# Patient Record
Sex: FEMALE | Race: WHITE
[De-identification: names, ages, dates, MRNs, and addresses within clinical notes are randomized per-mention and may not be internally consistent; named-entity substitution may affect disease eponyms.]

---

## 2021-07-26 ENCOUNTER — HOSPITAL ENCOUNTER (EMERGENCY)
Dept: HOSPITAL 33 - ED | Age: 51
Discharge: HOME | End: 2021-07-26
Payer: COMMERCIAL

## 2021-07-26 VITALS — SYSTOLIC BLOOD PRESSURE: 138 MMHG | HEART RATE: 90 BPM | DIASTOLIC BLOOD PRESSURE: 90 MMHG

## 2021-07-26 VITALS — OXYGEN SATURATION: 98 %

## 2021-07-26 DIAGNOSIS — S61.306A: Primary | ICD-10-CM
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
## 2021-07-26 PROCEDURE — 90471 IMMUNIZATION ADMIN: CPT

## 2021-07-26 PROCEDURE — 99283 EMERGENCY DEPT VISIT LOW MDM: CPT

## 2021-07-26 PROCEDURE — 90715 TDAP VACCINE 7 YRS/> IM: CPT

## 2021-07-26 NOTE — ERPHSYRPT
- History of Present Illness


Source: patient


Exam Limitations: no limitations


Patient Subjective Stated Complaint: Pt states "I was using a mandaline slicer 

and cut the side of my pinky on the right hand.  It has been bleeding for the 

past hour and I didnt know where else to go."


Triage Nursing Assessment: Pt presetend alert and oriented X 3, skin pwd Pt 

ambulates with an upright steady gait, able to speak in clear full sentences pt 

in no apparent respiratory distress. Pt has peeled the skin off the lateral side

of her right little finger.


Physician History: 





Mandolin tissue avulsion R ulnar side 5th digit before arrival at home. Pt is R 

handed and denies other/previous injury. Tetanus to be given in ER.


Occurred: just prior to arrival


Method of Injury: incised


Quality: constant


Severity of Pain-Max: mild


Severity of Pain-Current: mild


Extremities Pain Location: 5th finger: right


Modifying Factors: Improves With: movement


Associated Symptoms: none


Allergies/Adverse Reactions: 








No Known Drug Allergies Allergy (Verified 07/26/21 17:18)


   





Home Medications: 








No Reportable Medications [No Reported Medications]  07/26/21 [History]





Hx Tetanus, Diphtheria Vaccination/Date Given: No


Hx Influenza Vaccination/Date Given: No


Hx Pneumococcal Vaccination/Date Given: No


Immunizations Up to Date: Yes





Travel Risk





- International Travel


Have you traveled outside of the country in past 3 weeks: No





- Coronavirus Screening


Are you exhibiting any of the following symptoms?: No


Close contact with a COVID-19 positive Pt in past 14-21 Days: No





- Vaccine Status


Have you recieved a Covid-19 vaccination: No





- Review of Systems


Constitutional: No Symptoms


Eyes: No Symptoms


Ears, Nose, & Throat: No Symptoms


Respiratory: No Symptoms


Cardiac: No Symptoms


Abdominal/Gastrointestinal: No Symptoms


Genitourinary Symptoms: No Symptoms


Skin: No Symptoms


Neurological: No Symptoms


Psychological: No Symptoms


Endocrine: No Symptoms


Hematologic/Lymphatic: No Symptoms


Immunological/Allergic: No Symptoms





- Past Medical History


Pertinent Past Medical History: No


Neurological History: No Pertinent History


Cardiac History: No Pertinent History


Respiratory History: No Pertinent History


Endocrine Medical History: No Pertinent History


Musculoskeletal History: No Pertinent History





- Past Surgical History


Past Surgical History: No





- Social History


Smoking Status: Never smoker


Exposure to second hand smoke: No


Drug Use: none


Patient Lives Alone: No





- Female History


Hx Last Menstrual Period: 06/20/2021


Hx Pregnant Now: No





- Nursing Vital Signs


Nursing Vital Signs: 


                               Initial Vital Signs











Temperature  98.2 F   07/26/21 17:11


 


Pulse Rate  102 H  07/26/21 17:11


 


Respiratory Rate  20   07/26/21 17:11


 


Blood Pressure  144/96   07/26/21 17:11


 


O2 Sat by Pulse Oximetry  98   07/26/21 17:11








                                   Pain Scale











Pain Intensity                 0











Tachy/Hypertensive





- Physical Exam


General Appearance: no apparent distress


Eyes, Ears, Nose, Throat Exam: normal ENT inspection, TMs normal


Neck Exam: normal inspection, non-tender


Cardiovascular/Respiratory Exam: normal breath sounds, tachycardia (Very mild)


Abdominal Exam: non-tender


Back Exam: normal inspection


Shoulder Exam: normal inspection


Elbow/Forearm Exam: normal inspection


Wrist Exam: normal inspection


Hand Exam: laceration (Avulsion distal, ulnar 5th digit/good distal capillary 

return and sensation/nail-nailbed not invilved)


Neuro/Tendon Exam: normal sensation, normal motor functions, normal tendon 

functions, responds to pain


Mental Status Exam: alert, oriented x 3, cooperative


Skin Exam: normal color, warm, dry, No rash


**SpO2 Interpretation**: normal


SpO2: 98


O2 Delivery: Room Air





- Course


Nursing assessment & vital signs reviewed: Yes


Ordered Tests: 


Medication Summary














Discontinued Medications














Generic Name Dose Route Start Last Admin





  Trade Name Freq  PRN Reason Stop Dose Admin


 


Diphtheria/Tetanus/Acell Pertussis  0.5 ml  07/26/21 18:21  07/26/21 18:24





  Adacel Vial***  IM  07/26/21 18:22  0.5 ml





  .ONCE ONE   Administration


 


Diphtheria/Tetanus/Acell Pertussis  Confirm  07/26/21 18:23 





  Adacel Vial***  Administered  07/26/21 18:24 





  Dose  





  0.5 ml  





  IM  





  .STK-MED ONE  


 


Silver Nitrate  8 pkt  07/26/21 18:05  07/26/21 18:23





  Arzol Silver Nitrate Applicator  TP  07/26/21 18:06  8 pkt





  STAT ONE   Administration


 


Silver Nitrate  Confirm  07/26/21 18:04 





  Arzol Silver Nitrate Applicator  Administered  07/26/21 18:05 





  Dose  





  8 pkt  





  TP  





  .STK-MED ONE  


 


Silver Nitrate  Confirm  07/26/21 18:07 





  Arzol Silver Nitrate Applicator  Administered  07/26/21 18:08 





  Dose  





  4 pkt  





  TP  





  .STK-MED ONE  














- Progress


Progress Note: 





07/26/21 18:18


R 5th digit avulsion cauterized w Silver nitrate wo comps/Dressed per nursing


Tdap given


07/26/21 18:19


Pt refused pain meds


Counseled pt/family regarding: need for follow-up





- Departure


Departure Disposition: Home


Clinical Impression: 


 Avulsion of soft tissue





Condition: Stable


Critical Care Time: No


Referrals: 


WILLI BRANDT [Primary Care Provider] - 


Instructions:  Wound Care (DC)


Additional Instructions: 


Keep laceration dry for 24 hours, then wash 1-2 times a day with soap/water


Watch for signs of infection-redness/increasing pain/pus/temperature greater 

than 100.5

## 2024-08-05 ENCOUNTER — HOSPITAL ENCOUNTER (OUTPATIENT)
Dept: HOSPITAL 33 - SDC | Age: 54
Discharge: HOME | End: 2024-08-05
Attending: SURGERY
Payer: COMMERCIAL

## 2024-08-05 VITALS — SYSTOLIC BLOOD PRESSURE: 144 MMHG | DIASTOLIC BLOOD PRESSURE: 90 MMHG | OXYGEN SATURATION: 99 %

## 2024-08-05 VITALS — RESPIRATION RATE: 18 BRPM

## 2024-08-05 VITALS — TEMPERATURE: 97.5 F | HEART RATE: 76 BPM

## 2024-08-05 DIAGNOSIS — D12.2: ICD-10-CM

## 2024-08-05 DIAGNOSIS — Z12.11: Primary | ICD-10-CM

## 2024-08-05 DIAGNOSIS — K57.30: ICD-10-CM

## 2024-08-05 DIAGNOSIS — K62.1: ICD-10-CM

## 2024-08-05 NOTE — HP
HISTORY AND PHYSICAL 



HISTORY OF PRESENT ILLNESS:  The patient had increased swelling, axilla area.  Mammogram 
negative.  Question lipoma versus accessory breast tissue increasing in size.  She had 
been offered excision.  No prior colonoscopy and needs screening colonoscopy.  No bloody 
stools.  No changes in bowel habits.  No new pain.  Family history negative for colon 
cancer.



PAST MEDICAL HISTORY:  No chronic illness.



HOME MEDICATIONS:  Multivitamin.



ALLERGIES: No known drug allergies. 



PAST SURGICAL HISTORY:  She had tonsillectomy in the past.



SOCIAL HISTORY:  No smoking or alcohol abuse.



FAMILY HISTORY:  Negative.



REVIEW OF SYSTEMS:  Twelve systems reviewed.  No chest pain or palpitations.  Other 
systems negative or noncontributory as above and per preadmission questionnaire.



PHYSICAL EXAMINATION:  

GENERAL:  Height 5 feet 5 inches.  BMI 31.95.

GENERAL:  No acute distress.

HEENT:  Sclerae nonicteric.  Extraocular movements intact.  

NECK:  No JVD.

CHEST:  Equal excursion, nonlabored breathing.

CARDIOVASCULAR:  Regular rate and rhythm.

ABDOMEN:  Soft. 

EXTREMITIES:  No cyanosis or edema.

BREASTS:  Left axilla question whether lipoma versus accessory breast tissue.

NEUROLOGIC:  Alert, moving extremities symmetrically.

PSYCHIATRIC:  Appropriate mood and affect.

SKIN:  Dry.

RECTAL:  Deferred until the time of endoscopy exam.



IMPRESSION:  Needs screening colonoscopy.  Risks explained in detail including but not 
limited to bleeding or infection, risk of bowel injury or perforation possibly requiring 
open 

procedure, risk of missed or nondiagnosis, risk of incomplete exam possibly requiring 
barium enema or need for other procedures or referral, general risk of anesthesia or 
sedation but not limited to.  Consent obtained.  We will proceed with outpatient screening 
colonoscopy under MAC anesthesia. Melolabial Interpolation Flap Text: A decision was made to reconstruct the defect utilizing an interpolation axial flap and a staged reconstruction.  A telfa template was made of the defect.  This telfa template was then used to outline the melolabial interpolation flap.  The donor area for the pedicle flap was then injected with anesthesia.  The flap was excised through the skin and subcutaneous tissue down to the layer of the underlying musculature.  The pedicle flap was carefully excised within this deep plane to maintain its blood supply.  The edges of the donor site were undermined.   The donor site was closed in a primary fashion.  The pedicle was then rotated into position and sutured.  Once the tube was sutured into place, adequate blood supply was confirmed with blanching and refill.  The pedicle was then wrapped with xeroform gauze and dressed appropriately with a telfa and gauze bandage to ensure continued blood supply and protect the attached pedicle.

## 2024-08-06 NOTE — OP
SURGERY DATE/TIME:  08/05/2024  [id number]



PREOPERATIVE DIAGNOSIS:  Needs screening colonoscopy.



POSTOPERATIVE DIAGNOSES:  

1)    Small ascending colon polyp.

2)    Small early polyps versus hyperplastic lesions in the rectum x2.

3)    Diverticulosis, left colon.



PROCEDURES:  

1)    Colonoscopy to cecum with hot biopsy polypectomy, ascending colon polyp, removed in 
piecemeal fashion with hot biopsy forceps.

2)    Hot biopsy polypectomy, rectal polyps x2.



SURGEON:  Maik Vázquez MD 



ANESTHESIA:  MAC.



ESTIMATED BLOOD LOSS:  Minimal.



COMPLICATIONS:  There were no immediate complications. 



INDICATIONS:  As noted above, consent was obtained.  ASA class 1.  Withdrawal time was 
approximately 9 minutes.



DESCRIPTION OF PROCEDURE AND FINDINGS:  The patient was taken to the endoscopy room.  MAC 
anesthesia was introduced.  Digital rectal exam did not reveal any rectal masses.  
Videocolonoscope was inserted and passed up through the slightly tortuous sigmoid, 
descending, transverse, ascending colon, and around to the cecum.  Appendiceal orifice and 
valve were well visualized and photo documented.  I took a picture of the valve itself, 
and I took a couple pictures of the appendiceal orifice; the bowel itself, the pictures 
somehow did not take.  The scope was then carefully withdrawn.  In the proximal ascending 
colon, a small 2.5 mm polyp was removed with hot biopsy polypectomy.  Good hemostasis 
noted.  This was removed in a piecemeal fashion.  The scope was then carefully pulled 
back.  Prep overall was good.  Withdrawal time was approximately 9 minutes.  There was 
some diverticulosis in the left colon.  Back in the rectum, 2 small early polyps versus 
hyperplastic lesions were removed with hot biopsy polypectomy and brief bursts of cautery 
in the distal rectum.  Good hemostasis was noted.  Prep overall was good.  Withdrawal time 
was around 9 minutes.  Patient tolerated the procedure well.